# Patient Record
Sex: MALE | Race: BLACK OR AFRICAN AMERICAN | NOT HISPANIC OR LATINO | URBAN - METROPOLITAN AREA
[De-identification: names, ages, dates, MRNs, and addresses within clinical notes are randomized per-mention and may not be internally consistent; named-entity substitution may affect disease eponyms.]

---

## 2017-01-16 ENCOUNTER — EMERGENCY (EMERGENCY)
Facility: HOSPITAL | Age: 42
LOS: 1 days | Discharge: HOME | End: 2017-01-16
Admitting: EMERGENCY MEDICINE
Payer: SELF-PAY

## 2017-01-16 PROBLEM — Z00.00 ENCOUNTER FOR PREVENTIVE HEALTH EXAMINATION: Status: ACTIVE | Noted: 2017-01-16

## 2017-01-16 PROCEDURE — 99283 EMERGENCY DEPT VISIT LOW MDM: CPT | Mod: 25

## 2017-01-16 PROCEDURE — 72070 X-RAY EXAM THORAC SPINE 2VWS: CPT | Mod: 26

## 2017-01-16 PROCEDURE — 72070 X-RAY EXAM THORAC SPINE 2VWS: CPT

## 2017-01-16 PROCEDURE — 99284 EMERGENCY DEPT VISIT MOD MDM: CPT

## 2018-09-11 ENCOUNTER — APPOINTMENT (OUTPATIENT)
Dept: CT IMAGING | Facility: HOSPITAL | Age: 43
End: 2018-09-11
Payer: COMMERCIAL

## 2018-09-11 ENCOUNTER — OUTPATIENT (OUTPATIENT)
Dept: OUTPATIENT SERVICES | Facility: HOSPITAL | Age: 43
LOS: 1 days | End: 2018-09-11
Payer: COMMERCIAL

## 2018-09-11 DIAGNOSIS — R10.813 RIGHT LOWER QUADRANT ABDOMINAL TENDERNESS: ICD-10-CM

## 2018-09-11 PROCEDURE — 74160 CT ABDOMEN W/CONTRAST: CPT

## 2018-09-11 PROCEDURE — 74160 CT ABDOMEN W/CONTRAST: CPT | Mod: 26

## 2020-04-13 ENCOUNTER — TRANSCRIPTION ENCOUNTER (OUTPATIENT)
Age: 45
End: 2020-04-13

## 2020-06-21 ENCOUNTER — TRANSCRIPTION ENCOUNTER (OUTPATIENT)
Age: 45
End: 2020-06-21

## 2022-07-21 ENCOUNTER — NON-APPOINTMENT (OUTPATIENT)
Age: 47
End: 2022-07-21

## 2022-08-07 ENCOUNTER — NON-APPOINTMENT (OUTPATIENT)
Age: 47
End: 2022-08-07

## 2023-05-12 ENCOUNTER — EMERGENCY (EMERGENCY)
Facility: HOSPITAL | Age: 48
LOS: 1 days | Discharge: HOME | End: 2023-05-12
Attending: STUDENT IN AN ORGANIZED HEALTH CARE EDUCATION/TRAINING PROGRAM | Admitting: STUDENT IN AN ORGANIZED HEALTH CARE EDUCATION/TRAINING PROGRAM
Payer: COMMERCIAL

## 2023-05-12 VITALS
HEIGHT: 72 IN | WEIGHT: 289.91 LBS | TEMPERATURE: 98 F | SYSTOLIC BLOOD PRESSURE: 106 MMHG | HEART RATE: 83 BPM | RESPIRATION RATE: 17 BRPM | DIASTOLIC BLOOD PRESSURE: 70 MMHG | OXYGEN SATURATION: 97 %

## 2023-05-12 PROCEDURE — 72131 CT LUMBAR SPINE W/O DYE: CPT | Mod: 26,MA

## 2023-05-12 PROCEDURE — 99284 EMERGENCY DEPT VISIT MOD MDM: CPT | Mod: 25

## 2023-05-12 PROCEDURE — 96372 THER/PROPH/DIAG INJ SC/IM: CPT

## 2023-05-12 PROCEDURE — 72131 CT LUMBAR SPINE W/O DYE: CPT | Mod: MA

## 2023-05-12 PROCEDURE — 99284 EMERGENCY DEPT VISIT MOD MDM: CPT

## 2023-05-12 RX ORDER — KETOROLAC TROMETHAMINE 30 MG/ML
30 SYRINGE (ML) INJECTION ONCE
Refills: 0 | Status: DISCONTINUED | OUTPATIENT
Start: 2023-05-12 | End: 2023-05-12

## 2023-05-12 RX ORDER — TIZANIDINE 4 MG/1
2 TABLET ORAL
Qty: 24 | Refills: 0
Start: 2023-05-12 | End: 2023-05-15

## 2023-05-12 RX ADMIN — Medication 30 MILLIGRAM(S): at 16:15

## 2023-05-12 NOTE — ED PROVIDER NOTE - PHYSICAL EXAMINATION
VITAL SIGNS: I have reviewed nursing notes and confirm.  CONSTITUTIONAL: well-appearing, non-toxic, NAD  SKIN: Warm dry, normal skin turgor  HEAD: NCAT  CARD: RRR, no murmurs, rubs or gallops  RESP: clear to ausculation b/l.  No rales, rhonchi, or wheezing.  ABD: soft, + BS, non-tender, non-distended  EXT: Full ROM, no bony tenderness, no pedal edema, no calf tenderness  NEURO: normal motor. normal sensory. Normal gait.  PSYCH: Cooperative, appropriate.

## 2023-05-12 NOTE — ED ADULT NURSE NOTE - CAS EDP DISCH TYPE
Home
Atrial fibrillation    H/O cardiac arrhythmia    Hyperlipidemia    Hypertension    MVP (Mitral Valve Prolapse)    Osteoarthritis

## 2023-05-12 NOTE — ED PROVIDER NOTE - OBJECTIVE STATEMENT
47-year-old male with no significant past medical history presenting to the ED status post mechanical slip and fall at work states fell onto his back slid down complaining of lumbar back pain down 3 steps, denies hitting head or LOC no anticoagulation use.  Complaining of midline lumbar back pain without associate bowel bladder incontinence or saddle esthesia.  Patient able to ambulate without assistive devices.

## 2023-05-12 NOTE — ED PROVIDER NOTE - PATIENT PORTAL LINK FT
Patient Education - Anticoagulation You can access the FollowMyHealth Patient Portal offered by [facility] by registering at the following website: [email or web address]. By joining [facility]’s FollowMyHealth portal, you will also be able to view your health information using other applications (apps) compatible with our system.

## 2023-05-12 NOTE — ED ADULT TRIAGE NOTE - CHIEF COMPLAINT QUOTE
Pt c/o mid lower back pain from a fall today, denies LOC, no head injury no blood thinner, took 2 Aleve 9am

## 2023-05-12 NOTE — ED ADULT NURSE NOTE - OBJECTIVE STATEMENT
Patient came from home with complaint of back pain after a fall today. Denies any LOC or hitting his head. Denies any nausea, vomit, headache, dizziness or diarrhea. Patient took two Aleve with no relief.

## 2023-05-12 NOTE — ED PROVIDER NOTE - CLINICAL SUMMARY MEDICAL DECISION MAKING FREE TEXT BOX
CT lumbar with noted disc bulges otherwise no acute vertebral injuries, patient will follow-up with outpatient orthopedics return precautions discussed verbalized understanding and agreeable with discharge plan.

## 2023-05-12 NOTE — ED PROVIDER NOTE - CARE PROVIDER_API CALL
Heriberto Herrera (MD)  Orthopedics  [address], [address]  [place] [address]  Phone: [phone]  Fax: [phone]  Follow Up Time: 4-6 Days

## 2023-05-12 NOTE — ED ADULT NURSE NOTE - NSFALLRISKINTERV_ED_ALL_ED

## 2023-06-01 ENCOUNTER — APPOINTMENT (OUTPATIENT)
Dept: ORTHOPEDIC SURGERY | Facility: CLINIC | Age: 48
End: 2023-06-01
Payer: OTHER MISCELLANEOUS

## 2023-06-01 VITALS
BODY MASS INDEX: 39.28 KG/M2 | WEIGHT: 290 LBS | HEIGHT: 72 IN | SYSTOLIC BLOOD PRESSURE: 112 MMHG | OXYGEN SATURATION: 97 % | DIASTOLIC BLOOD PRESSURE: 78 MMHG | TEMPERATURE: 97.5 F | HEART RATE: 61 BPM

## 2023-06-01 DIAGNOSIS — M54.50 LOW BACK PAIN, UNSPECIFIED: ICD-10-CM

## 2023-06-01 DIAGNOSIS — S39.012A STRAIN OF MUSCLE, FASCIA AND TENDON OF LOWER BACK, INITIAL ENCOUNTER: ICD-10-CM

## 2023-06-01 DIAGNOSIS — M47.816 SPONDYLOSIS W/OUT MYELOPATHY OR RADICULOPATHY, LUMBAR REGION: ICD-10-CM

## 2023-06-01 PROCEDURE — 99204 OFFICE O/P NEW MOD 45 MIN: CPT

## 2023-06-01 NOTE — DISCUSSION/SUMMARY
[Medication Risks Reviewed] : Medication risks reviewed [de-identified] : He sustained a lumbosacral strain and sprain aggravating some mild pre-existing degenerative changes.  He has taken ibuprofen in the past without GI side effects and has no GI side effects after his lap band surgery.  He will rest and use moist heat and has been started on ibuprofen 600 mg 3 times a day as a nonsteroidal anti-inflammatory with instructions to continue it for 3 to 4 days after the symptoms are fully better.  I will see him for follow-up in 3 weeks on a as needed basis.  He has been given clearance to return to work.

## 2023-06-01 NOTE — HISTORY OF PRESENT ILLNESS
[de-identified] : This 47-year-old male on May 12 slid down for 5 steps at work and had acute lower back pain initially graded as a 7.  He was seen in the emergency room where a CT scan of the lumbar spine was performed.  He denies a history of prior back problems.  He has not had associated buttock or leg pain nor is he had neurologic symptoms of numbness, paresthesias or weakness.  There is no Valsalva effect.  He has not had night pain.  The pain can be worse sitting at times but he is comfortable standing and walking.  He has seen significant improvement and now has only occasional discomfort.  Tizanidine was prescribed but he has not been taking it.\par \par His past medical history is positive for lap band surgery in 2017.  He had asthma as a child.  He has a penicillin allergy. [Pain Location] : pain [0] : a minimum pain level of 0/10 [2] : a maximum pain level of 2/10 [Sitting] : sitting

## 2023-06-01 NOTE — PHYSICAL EXAM
[de-identified] : He is fully alert and oriented with a normal mood and affect.  He is in no acute distress as I take the history.  He is markedly overweight.  He ambulates with a normal gait including tiptoe and heel walking.  There are no cutaneous abnormalities or palpable bony defects of the spine.  There is no evidence of shortness of breath or respiratory distress.  There is no paravertebral muscle spasm, sciatic notch tenderness or trochanteric tenderness.  Forward flexion of the spine was easily to 70 degrees without pain.  His lower extremity neurological examination revealed 1-2+ symmetrical reflexes.  Motor power is normal to manual testing in all lower extremity groups and sensation is normal to light touch in all dermatomes.  Straight leg raising is negative to 90 degrees in the sitting position bilaterally.  His hips and his knees have a full and painless range of motion with normal stability.  Vascular examination does revealed some mild bilateral varicosities.  There is no lymphedema.  There are no cutaneous abnormalities of the upper or the lower extremities. [de-identified] : I reviewed the outside CT scan of the lumbar spine.  There are some degenerative changes at T10 12 L1 and L1-2.  Sagittal alignment is normal.  There are no destructive changes.  There are some changes of facet arthrosis in the lower lumbar spine and some minor disc bulges.

## 2023-06-01 NOTE — REASON FOR VISIT
[Initial Visit] : an initial visit for [Workers' Comp: Date of Injury: _______] : This visit is related to worker's compensation. Date of Injury: [unfilled] [Degenerative Joint Disease] : degenerative joint disease [Back Pain] : back pain

## 2023-11-03 ENCOUNTER — TRANSCRIPTION ENCOUNTER (OUTPATIENT)
Age: 48
End: 2023-11-03

## 2023-11-03 ENCOUNTER — EMERGENCY (EMERGENCY)
Facility: HOSPITAL | Age: 48
LOS: 1 days | Discharge: HOME | End: 2023-11-03
Attending: STUDENT IN AN ORGANIZED HEALTH CARE EDUCATION/TRAINING PROGRAM | Admitting: STUDENT IN AN ORGANIZED HEALTH CARE EDUCATION/TRAINING PROGRAM
Payer: COMMERCIAL

## 2023-11-03 VITALS
HEIGHT: 72 IN | SYSTOLIC BLOOD PRESSURE: 119 MMHG | DIASTOLIC BLOOD PRESSURE: 79 MMHG | HEART RATE: 72 BPM | WEIGHT: 289.91 LBS | RESPIRATION RATE: 17 BRPM | OXYGEN SATURATION: 98 % | TEMPERATURE: 98 F

## 2023-11-03 VITALS
SYSTOLIC BLOOD PRESSURE: 110 MMHG | DIASTOLIC BLOOD PRESSURE: 62 MMHG | RESPIRATION RATE: 16 BRPM | OXYGEN SATURATION: 98 % | HEART RATE: 60 BPM

## 2023-11-03 PROCEDURE — 93971 EXTREMITY STUDY: CPT

## 2023-11-03 PROCEDURE — 99284 EMERGENCY DEPT VISIT MOD MDM: CPT

## 2023-11-03 PROCEDURE — 99284 EMERGENCY DEPT VISIT MOD MDM: CPT | Mod: 25

## 2023-11-03 PROCEDURE — 93971 EXTREMITY STUDY: CPT | Mod: 26,LT

## 2023-11-03 RX ORDER — APIXABAN 2.5 MG/1
1 TABLET, FILM COATED ORAL
Qty: 60 | Refills: 0
Start: 2023-11-03 | End: 2023-12-02

## 2023-11-03 RX ORDER — OXYCODONE AND ACETAMINOPHEN 5; 325 MG/1; MG/1
1 TABLET ORAL
Qty: 2 | Refills: 0
Start: 2023-11-03 | End: 2023-11-04

## 2023-11-03 NOTE — ED ADULT NURSE NOTE - OBJECTIVE STATEMENT
Pt. to ED complaining of lateral left leg pain for the past few days.  Pt. states he hit it on furniture and had a bump there but now it hurts when touched.  Pt. denies any other injuries or falls.  Pt. denies pain at rest but complains of pain with any touch or ambulation.

## 2023-11-03 NOTE — ED PROVIDER NOTE - CARE PROVIDER_API CALL
Manvar-Singh, Pallavi Buddhadev  Vascular Surgery  [address]  [place] [address]  Phone: [phone]  Fax: [phone]  Follow Up Time: 4-6 Days

## 2023-11-03 NOTE — ED PROVIDER NOTE - PATIENT PORTAL LINK FT
You can access the FollowMyHealth Patient Portal offered by [facility] by registering at the following website: [email or web address]. By joining [facility]’s FollowMyHealth portal, you will also be able to view your health information using other applications (apps) compatible with our system.

## 2023-11-03 NOTE — ED PROVIDER NOTE - CLINICAL SUMMARY MEDICAL DECISION MAKING FREE TEXT BOX
48-year-old male presented to the ED with complaints of left lateral thigh discomfort after hitting along furniture, patient with reported increasing pain, concerned about clot, patient with noted varicose veins no history of prior DVT or PE denies any chest pain or shortness of breath denies any other complaints.  History of diabetes    Patient with noted multiple varicose veins, with noted multiple clots, does not appear to drain into the deep venous system, given increased pain and amount of varicose veins with clots, will elect for anticoagulation patient will follow-up with outpatient vascular, return precautions discussed verbalized understanding agreeable discharge plan..

## 2023-11-03 NOTE — ED PROVIDER NOTE - OBJECTIVE STATEMENT
48-year-old male presented to the ED with complaints of left lateral thigh discomfort after hitting along furniture, patient with reported increasing pain, concerned about clot, patient with noted varicose veins no history of prior DVT or PE denies any chest pain or shortness of breath denies any other complaints.  History of diabetes

## 2024-04-04 PROBLEM — Z78.9 OTHER SPECIFIED HEALTH STATUS: Chronic | Status: ACTIVE | Noted: 2023-11-03

## 2024-05-28 ENCOUNTER — APPOINTMENT (OUTPATIENT)
Dept: BARIATRICS | Facility: CLINIC | Age: 49
End: 2024-05-28
Payer: COMMERCIAL

## 2024-05-28 ENCOUNTER — NON-APPOINTMENT (OUTPATIENT)
Age: 49
End: 2024-05-28

## 2024-05-28 VITALS
OXYGEN SATURATION: 98 % | WEIGHT: 300 LBS | SYSTOLIC BLOOD PRESSURE: 110 MMHG | DIASTOLIC BLOOD PRESSURE: 70 MMHG | BODY MASS INDEX: 42 KG/M2 | TEMPERATURE: 97.4 F | HEART RATE: 83 BPM | HEIGHT: 71 IN

## 2024-05-28 DIAGNOSIS — Z83.3 FAMILY HISTORY OF DIABETES MELLITUS: ICD-10-CM

## 2024-05-28 DIAGNOSIS — R63.5 ABNORMAL WEIGHT GAIN: ICD-10-CM

## 2024-05-28 DIAGNOSIS — Z78.9 OTHER SPECIFIED HEALTH STATUS: ICD-10-CM

## 2024-05-28 DIAGNOSIS — Z86.39 PERSONAL HISTORY OF OTHER ENDOCRINE, NUTRITIONAL AND METABOLIC DISEASE: ICD-10-CM

## 2024-05-28 DIAGNOSIS — Z98.84 BARIATRIC SURGERY STATUS: ICD-10-CM

## 2024-05-28 DIAGNOSIS — Z13.21 ENCOUNTER FOR SCREENING FOR NUTRITIONAL DISORDER: ICD-10-CM

## 2024-05-28 DIAGNOSIS — E66.01 MORBID (SEVERE) OBESITY DUE TO EXCESS CALORIES: ICD-10-CM

## 2024-05-28 DIAGNOSIS — E46 UNSPECIFIED PROTEIN-CALORIE MALNUTRITION: ICD-10-CM

## 2024-05-28 DIAGNOSIS — R63.8 OTHER SYMPTOMS AND SIGNS CONCERNING FOOD AND FLUID INTAKE: ICD-10-CM

## 2024-05-28 DIAGNOSIS — R63.2 POLYPHAGIA: ICD-10-CM

## 2024-05-28 PROCEDURE — 99205 OFFICE O/P NEW HI 60 MIN: CPT

## 2024-05-28 RX ORDER — IBUPROFEN 600 MG/1
600 TABLET, FILM COATED ORAL
Qty: 90 | Refills: 0 | Status: DISCONTINUED | COMMUNITY
Start: 2023-06-01 | End: 2024-05-28

## 2024-05-28 RX ORDER — MULTIVITAMIN
TABLET,CHEWABLE ORAL
Refills: 0 | Status: ACTIVE | COMMUNITY

## 2024-05-28 NOTE — PHYSICAL EXAM
[Normal] : affect appropriate [de-identified] : Well, healthy-appearing adult [de-identified] :  Soft, NT, ND, no hernias appreciated, midline diastasis, nontender supraumbilical port site  [de-identified] : RYAN

## 2024-05-28 NOTE — END OF VISIT
[FreeTextEntry3] : I, Dr. Miramontes personally performed the evaluation and management (E/M) services for this new patient. That E/M includes conducting the clinically appropriate initial history &/or exam, assessing all conditions, and establishing the plan of care. Today, my MARVIN, [employer], was here to observe my evaluation and management service for this patient & follow plan of care established by me going forward. [Time Spent: ___ minutes] : I have spent [unfilled] minutes of time on the encounter. No

## 2024-05-28 NOTE — ASSESSMENT
[FreeTextEntry1] : 48M with history of RYGB (2015) and band over bypass (2019) presenting today for further weight loss. ------------ Health maintenance and behavioral/nutrition counseling for obesity: An additional 30 minutes of the visit was spent counseling the patient regarding need for aggressive weight loss and behavior modification including nutrition and proper eating habits, including which foods to eat and avoid.  I had a lengthy discussion regarding the patient's current nutrition and eating habits as detailed above in the HPI. I emphasized the importance of making healthier food choices including fresh fruits and vegetables, lean meats and protein sources. I recommended front loading calories, incorporating whole grains, and eliminating fast foods. I also discussed the importance of avoiding fried/fatty foods and foods containing high sugar content including juices/shakes/sodas/desserts.  I also encouraged beginning an exercise program and recommended cardiovascular exercises along with strength training to build lean muscle. I made suggestions on different types of exercises to try. Patient will work on the following: -Meet with nutritionist  -Eliminate snacks  -Focus on eating 3 well-balanced meals during the daytime with appropriate portion size -Cooking fresh meals rather than take out/processed/ready-made foods -Incorporating exercise ------------   I recommended patient see his own bariatric surgeon (at [facility]) and discuss options for further weight loss with them such as revisional surgery and weight loss medications. In addition I educated the patient regarding his bypass anatomy using diagrams and explained to him that checking vitamin levels and taking a bariatric multivitamin were necessary. Patient verbalized understanding and will make an appointment to see his surgeon. Patient also needs VE given his band. Patient will call back if he would like to follow with us after speaking to his bariatric surgeon.  Additional time spent before and after encounter reviewing chart

## 2024-05-28 NOTE — REASON FOR VISIT
[Initial Consult] : an initial consult for [S/P Bariatric Surgery] : s/p bariatric surgery [Other___] : [unfilled]

## 2024-05-28 NOTE — REVIEW OF SYSTEMS
[Patient Intake Form Reviewed] : Patient intake form was reviewed [Recent Change In Weight] : ~T recent weight change [Negative] : Allergic/Immunologic [Fever] : no fever [Chills] : no chills [Night Sweats] : no night sweats [Fatigue] : no fatigue

## 2024-05-28 NOTE — HISTORY OF PRESENT ILLNESS
[Procedure: ___] : Procedure performed: [unfilled]  [Date of Surgery: ___] : Date of Surgery:   [unfilled] [Surgeon Name:   ___] : Surgeon Name: Dr. PADILLA [Pre-Op Weight ___] : Pre-op weight was [unfilled] lbs [de-identified] : ALETHEA MAST is a 48 old male with a long-standing Hx of obesity s/p gastric bypass (2015) and lap band surgery (2019) presents today for initial evaluation for weight management options. Patient has not followed up with either bariatric surgeon's office since 2020 and has gradually gained weight since then. Patient states his last band adjustment was in 2020 at [place], and believes he has about 4-5cc in his band with a max capacity of 10cc. He has not had any complications with either bariatric surgery and has minimal stuck episodes and reflux after certain foods. Patient is interested in getting back on track with his diet and starting medications.  Reports no history of substance abuse, significant anxiety, uncontrolled blood pressure, kidney stones, glaucoma, or pancreatitis. Reports no family history of thyroid cancer or MEN 2 syndrome.   Heaviest/current wt: 365 lbs (pre-op) -> 240 lb (lowest wt after bypass), 320 lb (pre-op) -> 260 lb (lowest wt after band)  Diets/exercise programs tried in the past: low-calorie diet, exercise with  Weight loss medications tried in the past: none Reason for stopping weight loss medication if applicable: n/a  History of bariatric surgery: gastric bypass (2015, [facility]), lap band (2019, [facility])  Obesity comorbidities: DM2, asthma Comorbidities improved/resolved: DM2 (off insulin) Anti-obesity medication: none Obesity medication side effects: n/a  Current dietary lifestyle:  Breakfast: bagel, muffin, tea + 2 splenda (takes out 7 days/week) Lunch: leftovers- chicken, or takeout- fajita, rice + beans (takes out 2x/week) Dinner: chicken, half a turkey sandwich (takes out 2x/week) Snacks: frequently finishes leftovers 2 hours after meals, sugary snacks in the afternoon and at night Drinks: 12-32 oz water daily, diet tea  Activity Lifestyle:  Sleep: 5 hr, feels well-rested Physical activity/exercise: walks 8k steps daily Work:  (WF)  Smoking/ETOH: no   Last Colonoscopy: patient awaiting call back from GI today to schedule first colonoscopy Last PSA Check: never, will call to schedule [de-identified] : laparoscopic gastric lap band, 2019, unknown surgeon at [facility]

## 2024-09-03 ENCOUNTER — APPOINTMENT (OUTPATIENT)
Dept: BARIATRICS/WEIGHT MGMT | Facility: CLINIC | Age: 49
End: 2024-09-03

## 2025-08-12 ENCOUNTER — APPOINTMENT (OUTPATIENT)
Dept: ULTRASOUND IMAGING | Facility: HOSPITAL | Age: 50
End: 2025-08-12
Payer: COMMERCIAL

## 2025-08-12 ENCOUNTER — OUTPATIENT (OUTPATIENT)
Dept: OUTPATIENT SERVICES | Facility: HOSPITAL | Age: 50
LOS: 1 days | End: 2025-08-12
Payer: COMMERCIAL

## 2025-08-12 DIAGNOSIS — Z00.8 ENCOUNTER FOR OTHER GENERAL EXAMINATION: ICD-10-CM

## 2025-08-12 PROCEDURE — 76536 US EXAM OF HEAD AND NECK: CPT | Mod: 26

## 2025-08-12 PROCEDURE — 76536 US EXAM OF HEAD AND NECK: CPT

## 2025-08-15 ENCOUNTER — OUTPATIENT (OUTPATIENT)
Dept: OUTPATIENT SERVICES | Facility: HOSPITAL | Age: 50
LOS: 1 days | End: 2025-08-15
Payer: COMMERCIAL

## 2025-08-15 ENCOUNTER — APPOINTMENT (OUTPATIENT)
Dept: CT IMAGING | Facility: HOSPITAL | Age: 50
End: 2025-08-15

## 2025-08-15 DIAGNOSIS — L72.3 SEBACEOUS CYST: ICD-10-CM

## 2025-08-15 PROCEDURE — 70491 CT SOFT TISSUE NECK W/DYE: CPT | Mod: 26

## 2025-08-15 PROCEDURE — 70491 CT SOFT TISSUE NECK W/DYE: CPT

## 2025-08-28 ENCOUNTER — APPOINTMENT (OUTPATIENT)
Dept: ULTRASOUND IMAGING | Facility: HOSPITAL | Age: 50
End: 2025-08-28

## 2025-09-05 ENCOUNTER — NON-APPOINTMENT (OUTPATIENT)
Age: 50
End: 2025-09-05

## 2025-09-05 ENCOUNTER — APPOINTMENT (OUTPATIENT)
Dept: SURGERY | Facility: CLINIC | Age: 50
End: 2025-09-05
Payer: COMMERCIAL

## 2025-09-05 DIAGNOSIS — L72.0 EPIDERMAL CYST: ICD-10-CM

## 2025-09-05 DIAGNOSIS — E66.01 MORBID (SEVERE) OBESITY DUE TO EXCESS CALORIES: ICD-10-CM

## 2025-09-05 DIAGNOSIS — Z98.84 BARIATRIC SURGERY STATUS: ICD-10-CM

## 2025-09-05 DIAGNOSIS — R22.1 LOCALIZED SWELLING, MASS AND LUMP, NECK: ICD-10-CM

## 2025-09-05 DIAGNOSIS — Z83.3 FAMILY HISTORY OF DIABETES MELLITUS: ICD-10-CM

## 2025-09-05 PROCEDURE — 99213 OFFICE O/P EST LOW 20 MIN: CPT
